# Patient Record
Sex: FEMALE | Race: WHITE | Employment: STUDENT | URBAN - METROPOLITAN AREA
[De-identification: names, ages, dates, MRNs, and addresses within clinical notes are randomized per-mention and may not be internally consistent; named-entity substitution may affect disease eponyms.]

---
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
## 2021-04-26 ENCOUNTER — MYC MEDICAL ADVICE (OUTPATIENT)
Dept: PEDIATRICS | Facility: CLINIC | Age: 21
End: 2021-04-26

## 2021-04-26 DIAGNOSIS — F81.9 LEARNING DIFFICULTY: Primary | ICD-10-CM

## 2021-09-26 ENCOUNTER — HEALTH MAINTENANCE LETTER (OUTPATIENT)
Age: 21
End: 2021-09-26

## 2022-01-16 ENCOUNTER — HEALTH MAINTENANCE LETTER (OUTPATIENT)
Age: 22
End: 2022-01-16

## 2022-01-19 NOTE — PROGRESS NOTES
Patient seen for full day appointment. Please refer to neuropsychological assessment report associated with the 1/25/22 at 8:00 appointment for full details.    Babak Shukla, PhD, ABPP

## 2022-01-19 NOTE — PROGRESS NOTES
RE: Zakiya Barnes  MR#: [id number]  : 2000  DOS: 2022  JONEL:  2022      NEUROPSYCHOLOGICAL CONSULTATION      REASON FOR REFERRAL:  Zakiya Barnes is a 21 year old female with 14 years of education who is currently a [employer] in college. The patient was referred for a neuropsychological evaluation by Dr. Vargas secondary to memory and learning concerns, particularly regarding reading. The evaluation was requested in order to document her cognitive and emotional functioning, assist with the differential diagnosis, and provide appropriate recommendations. The patient was informed that the evaluation included multiple measures of performance and symptom validity, and she was encouraged to provide her best effort at all times.  The nature of the neuropsychological evaluation was also discussed, including limits of confidentiality (for suspected child or elder abuse, potential homicide or suicide, and court orders). The patient was also informed that the report would be placed on the electronic medical records system.  The patient was also given an opportunity to ask questions. The patient indicated that she understood the information and consented to participate in the evaluation.    PROCEDURES:   Review of records and clinical interview  Mental Status-orientation, Wide Range Achievement Test-4, Wechsler Adult Intelligence Scale-IV, Stone Verbal Learning Test-Revised, Clock Drawing Test, Verbal Fluency Test, Personality Assessment Inventory, Shawn Complex Figure Test, Trailmaking Test, NAB Naming Test, TOMM, Judgment of Line Orientation Test, Stroop Test, Wisconsin Card Sorting Test and Wechsler Memory Scale-IV (selected subtests)    REVIEW OF RECORDS: Medical records from 21 noted concerns with learning difficulties. Records from 2020 noted that the patient was  concerned she has dyslexia.  It takes her along time to write a paragraph, reading and comprehension.  She  "feels like it is forever.  her roommate is writing a paper on dyslexia and she thinks that Zakiya has all the symptoms of this.  She is a sophomore at [employer]. All of her math grades were good in high school and writing was C's.  She is a music and psychology major.  Her end goal is to do music therapy.  Her teachers have told her in the past that she should be tested for dyslexia.  No neuroimaging scan of the head reports were found in the electronic medical records. Records noted that the patient was being treated with Isibloom, Kenalog, azelastine, acetaminophen, Benadryl, and loratadine.  However, during the interview the patient reported she was not currently taking any medications.    CLINICAL INTERVIEW: The patient was [facility] via video platform [facility] and she was interviewed alone. On interview, the patient reported that she has difficulty with reading and writing. Specifically, she noted in the spring semester she was attempting to complete a writing assignment and it took her 3-4 hours because she had difficulty making sense of the words, and her roommate expressed concern with possible dyslexia. She noted difficulty with reading comprehension, and indicated that when she has read a text \"it does not stick\".\" She stated that when she is doing multiple choice tests, she will miss details in the questions. She also reported that when she has in a high school Macedonian class and doing a dictation assignment, she would put the letters in the wrong order. She indicated that she has always had some concerns with her academic abilities, but this became more of an issue last year. In terms of her memory, she characterized herself as a better visual learner than verbal learning. She noted that she will occasionally misplace items, such as her phone. In terms of attention/concentration, she reported that she will often procrastinate and do other household chores before doing " "homework. She stated that she will fixate on chores if her home is cluttered or close any folding. She reported that her friends have said she is \"slightly OCD\". She noted that she has a good organizational system, but sometimes \"I'll freak out\" if \"something is off.\" She also noted difficulty with time management. Specifically, she reported she will often leave very early for appointments. She denied any functional difficulties with driving, financial management, or household chores. She indicated she is currently not taking any medications.    In terms of her mood, she reported she is generally upbeat, but gets frustrated with her reading difficulties. She reported that her friends have noticed she has high levels of anxiety, and when she is feeling very anxious she will go for a run. She also noted difficulty with falling asleep, and indicated she often will fall asleep between 1:00-3:00 AM. She noted that she sleeps about 6 hours per night on average. In terms of her appetite, she reported that she eats less than other people do. She reported that she typically eats 1-2 small meals per day and then snacks. She denied any contact with mental health professionals. She also denied any suicidal or homicidal ideation, and denied any history of suicide attempts. Further she denied any hallucinations or delusions. In terms of alcohol use, she reported she would drink between 0-2 alcoholic beverages per week. She denied any use of tobacco or illicit substances.    Medically, the patient reported that she is healthy. She denied any history of traumatic brain injury with loss of consciousness. She also denied any problems with pain. Further, she denied any history of multiple sclerosis, stroke, seizures, Parkinson's disease, Covid-19 infection, hypercholesterolemia, sleep apnea, high medicine, diabetes, heart disease, cancer, hypertension, liver disease, or kidney disease. She indicated she was eyeglasses all the time but " "denied any hearing problems.    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 She reported that her goal is to become a music therapist. She noted that her grade point average was between 3.0 and 4.0. She indicated that she is generally an \"A\" to \"B\" student, with occasional C's. She denied ever being in special education classes, except in elementary school she did receive extra assistance for reading. She denied ever having to repeat a grade. She noted that classes involving writing and some music history classes were particular challenging, but math is \"super easy.\" The patient said that she lives with 3 roommates at school. She reported she is from the [facility], and has a twin sister and older brother. She reported that she also works as a  in addition to going to school.    BEHAVIORAL OBSERVATIONS:  On examination, the patient was casually but appropriately dressed and groomed. The patient was cooperative with the evaluation and was talkative.  Her speech was normal in volume, rate and tone.  The patient also appeared to put forth her best effort on all tasks. Thus, the results of this evaluation are a reasonably valid reflection of the patient s current level of functioning. There were no indications of hallucinations, delusions or unusual thought processes and she was generally well oriented to personal information, place, and time. There were no demonstrations of a practical memory problem. The patient was a good historian, with a self-report consistent with medical records. Her affect was also generally appropriate.    RESULTS: Formal performance validity measures were mildly variable, indicating evidence that noncognitive factors may have impacted the results at times. Thus, results were interpreted with caution. In spite of this, many assessment tasks were quite strong. Specifically, measures of the " patient's verbally mediated intellectual functioning were in the average range, while visually mediated intellectual functioning was in the superior range and a significant personal strength.    On formal assessment of academic functioning, academic achievement in mathematics, including math problem solving and mathematical calculations, was quite strong, ranging from the upper half of the average range to the superior range. However, academic achievement in reading, including single word reading ability and pseudoword decoding (which assesses her ability to understand words are constructed), was in the low average range and at approximately the 7th-8th grade level. Reading comprehension was slightly higher, and at the lower end of the average range and at the 9th grade level. Academic achievement in writing, including written spelling and sentence composition, was likewise in the low average range and generally at the 7th grade level.    Measures of attention/concentration were mildly variable. Specifically, a digit span task was in the low average range, however mental calculation was in the high average range. A visual scanning task that integrates attention was in the superior range. Speed of information processing was well within expected limits. For example, psychomotor speed was in the superior range. Motor-free processing speed was likewise in the high average to superior range.    Measures of the patient's memory functioning were mildly variable. Specifically, immediate and delayed verbal recall on a story memory task was in the low average range. However, immediate and delayed verbal recall on a word list learning task was in the average range. Verbal recognition memory was also variable, ranging from below expected limits to well within expected limits. Visual memory was within expected limits. For example, immediate and delayed visual recall of a complex figure drawing was in the average range. Visual  recognition memory was also within expected limits.    Expressive language functioning was generally within expected limits.. For example, confrontation naming was in the average range. Semantic fluency was also in the average range, but phonemic fluency was in the low average range. Receptive language, as assessed by a complex ideational material test, was in the exceptionally low range. However, this is an isolated finding and likely related to other, noncognitive, factors rather than receptive language difficulties.    Visual-spatial and visual constructional abilities were within expected limits, and generally a personal strength. For example, a block construction task was in the high average range. Further, motor-free visual reasoning was in the high average range. Clock drawing and complex figure drawing task did not indicate evidence for significant visual-spatial distortions. Further, motor-free line orientation judgment was within expected limits.    Measures of the  patient's executive functioning were also within expected limits. For example, a measure of cognitive flexibility and set shifting ability was in the average range, indicating evidence that the patient could appropriately utilize feedback in order to alter and improve her performance. Additionally, a measure of response inhibition that integrates processing speed was in the high average range. A complex visual scanning task that integrates cognitive flexibility was in the high average range as well. Verbal reasoning was generally in the average range, while visual reasoning was in the high average range. Clock drawing and complex figure drawing tasks did not indicate evidence for significant planning or organizational difficulties.    Formal personality evaluation was completed utilizing the clinical interview and an objective measure of emotional functioning. On the objective measure, one validity scale that assesses an individual's ability to  attend and understand item content was above expected cutoffs, therefore this measure could not be interpreted. However, the pattern of results was consistent with her report of obsessive-compulsive tendencies.    SUMMARY: In summary, the neuropsychological assessment results indicated evidence for significant strengths in visual processing ability and visual-spatial abilities relative to lower, but still average, verbal abilities. However, academic achievement in reading was significantly poorer than expected and in the low average range. Likewise, academic achievement in written expression was poorer than expected and in the low average range. 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 In contrast, academic achievement in mathematics was quite strong and a personal strength. In terms of other cognitive abilities, attention/concentration was variable, which likely resulted in mild variability in learning new information. However, there was no evidence for rapid forgetting of previously learned information. Expressive language functioning, visual-spatial abilities, and executive functioning were all well within expected limits. Formal personality evaluation could not be formally interpreted, but the patient noted anxiety related difficulties, including some obsessive-compulsive traits, which are likely an attempt to manage her anxiety. The variability in task engagement and attention is likely related to her heightened level of anxiety.    RECOMMENDATIONS:   1. Given her heightened level of anxiety, a referral for psychotherapeutic intervention likely would be helpful. A highly structured cognitive-behavioral intervention focused on coping and stress management would likely be the most beneficial. One option for this service is  through the [facility] at [email or web address] or [phone]. Alternatively, the patient is encouraged to seek out psychotherapeutic resources through her campus counseling center.  2. Given the significant difficulties with reading and written expression, several academic accommodations are recommended, particularly when examinations or assignments involve reading/writing with timed deadlines.     Allow additional time for timed tests, particularly in classes with heavy reading and writing loads.. Specifically, she would benefit from 1.5 times the usual allotted time for these tests (e.g. 50% additional time). She is encouraged to use the entire allotted time given to re-read test questions carefully and review his answers.     Allow her to take examinations in a quiet room to minimize distractions.    Take a reduced course load, given the extended time required to successfully complete each class.  3. It is recommended that the patient optimize her environment to aid her attention and focus as well as compensate for variable processing speed. This includes:    Sit in the front of the room during lectures to minimize distractions.    Label, highlight, underline and add color to directions on assignments.     Have class notes printed and reviewed in advance.    If notes are not available, having the professor provide them in advance.     Record class lectures so they can be reviewed at a later time and at a pace that is comfortable for the patient.     The patient is encouraged to seek out a  for additional assistance with reading and written expression.   4. In order to promote learning and memorization of new verbal material, it is recommended that the patient add structure to the material she is learning to promote subsequent recall (e.g., use mnemonic devices, acronyms, make up a story or song). Additionally, she is encouraged to use  visual aids, such as flash cards, diagrams, charts, etc.   5. The patient may find the following attention and organizational strategies helpful:     Use cell phone reminders to help monitor upcoming appointments and due dates as well as other important tasks (e.g., when to take medications). Set the reminder to go off several times prior to the event with advanced notice (e.g., one week before, two days before, the day before, and the morning of the event).     She should attempt to reduce distractions at home. Having a clutter-free work environment and removing or at least making it harder to access potential distractions would help optimize her attention. She might also consider facing away from high traffic areas and using earplugs or noise cancellation headphones when desiring a quiet work environment.    Taking short breaks during her day may help optimize and maintain her concentration.     It is common for individuals like the patient who struggle with variable attention to develop procrastination habits. She may find it useful to evaluate whether she is procrastinating on a task because she considers it to be overwhelming. If so, she may find it useful to break the task down into more manageable components or steps.    Make to-do lists and prioritize tasks. Break down large tasks into smaller steps and check off each small step when completed.   6. The results of the evaluation now constitute a baseline of the patient s cognitive and emotional functioning. If further cognitive difficulties are observed in the future, a referral for a neuropsychological re-evaluation at that time might be considered.    Results and recommendations were provided the patient via telephone on 1/25/2022 and her questions were answered.  Thank you for referring this interesting individual. If you have any questions, please feel free to contact me.      Babak Shukla, PhD, ABPP, LP  Professor and Licensed Psychologist  [id number]  Board Certified Clinical Neuropsychologist    Time Spent:      Minutes Date Code Units   Total Time-Neuropsychologist Professional 278 1/25/2022 34507 1     1/25/2022 57435 4   Neuropsychologist Admin/scoring 0 1/25/2022 83201 0     1/25/2022 93667 0   Diagnostic Interview  17 1/25/2022 82330 1   Psychometrician Time-Test Administration/Score 368 1/25/2022 93240 1     1/25/2022 57902 11   Diagnosis R41.3 F41.9 F81.0 F81.81

## 2022-01-25 ENCOUNTER — OFFICE VISIT (OUTPATIENT)
Dept: NEUROPSYCHOLOGY | Facility: CLINIC | Age: 22
End: 2022-01-25
Attending: NURSE PRACTITIONER
Payer: COMMERCIAL

## 2022-01-25 DIAGNOSIS — F41.9 ANXIETY: ICD-10-CM

## 2022-01-25 DIAGNOSIS — R41.3 MEMORY LOSS: Primary | ICD-10-CM

## 2022-01-25 DIAGNOSIS — F81.81 DISORDER OF WRITTEN EXPRESSION: ICD-10-CM

## 2022-01-25 DIAGNOSIS — Z03.89 NO DIAGNOSIS ON AXIS I: Primary | ICD-10-CM

## 2022-01-25 DIAGNOSIS — F81.0 DEVELOPMENTAL READING DISORDER: ICD-10-CM

## 2022-01-25 PROCEDURE — 96138 PSYCL/NRPSYC TECH 1ST: CPT | Performed by: PSYCHOLOGIST

## 2022-01-25 PROCEDURE — 99207 PR NO CHARGE LOS: CPT | Performed by: PSYCHOLOGIST

## 2022-01-25 PROCEDURE — 96139 PSYCL/NRPSYC TST TECH EA: CPT | Performed by: PSYCHOLOGIST

## 2022-01-25 PROCEDURE — 96133 NRPSYC TST EVAL PHYS/QHP EA: CPT | Performed by: PSYCHOLOGIST

## 2022-01-25 PROCEDURE — 90791 PSYCH DIAGNOSTIC EVALUATION: CPT | Performed by: PSYCHOLOGIST

## 2022-01-25 PROCEDURE — 96132 NRPSYC TST EVAL PHYS/QHP 1ST: CPT | Performed by: PSYCHOLOGIST

## 2022-01-25 NOTE — PROGRESS NOTES
Pt was seen for neuropsychological evaluation at the request of Marce COYLE CNP for the purposes of diagnostic clarification and treatment planning. 368 minutes of test administration and scoring were provided by this writer. Please see Dr. Babak Shukla's report for a full interpretation of the findings.    Judy Strickland  Psychometrist

## 2022-01-25 NOTE — LETTER
2022      RE: Zakiya Barnes  [address] [facility] [id number]     RE: Zakiya Barnes  MR#: [id number]  : 2000  DOS: 2022  JONEL:  2022      NEUROPSYCHOLOGICAL CONSULTATION      REASON FOR REFERRAL:  Zakiya Barnes is a 21 year old female with 14 years of education who is currently a [employer] in college. The patient was referred for a neuropsychological evaluation by Dr. Vargas secondary to memory and learning concerns, particularly regarding reading. The evaluation was requested in order to document her cognitive and emotional functioning, assist with the differential diagnosis, and provide appropriate recommendations. The patient was informed that the evaluation included multiple measures of performance and symptom validity, and she was encouraged to provide her best effort at all times.  The nature of the neuropsychological evaluation was also discussed, including limits of confidentiality (for suspected child or elder abuse, potential homicide or suicide, and court orders). The patient was also informed that the report would be placed on the electronic medical records system.  The patient was also given an opportunity to ask questions. The patient indicated that she understood the information and consented to participate in the evaluation.    PROCEDURES:   Review of records and clinical interview  Mental Status-orientation, Wide Range Achievement Test-4, Wechsler Adult Intelligence Scale-IV, Stone Verbal Learning Test-Revised, Clock Drawing Test, Verbal Fluency Test, Personality Assessment Inventory, Shawn Complex Figure Test, Trailmaking Test, NAB Naming Test, TOMM, Judgment of Line Orientation Test, Stroop Test, [place] Card Sorting Test and Wechsler Memory Scale-IV (selected subtests)    REVIEW OF RECORDS: Medical records from 21 noted concerns with learning difficulties. Records from 2020 noted that the patient was  concerned she has  "dyslexia.  It takes her along time to write a paragraph, reading and comprehension.  She feels like it is forever.  her roommate is writing a paper on dyslexia and she thinks that Zakiya has all the symptoms of this.  She is a sophomore at [employer]. All of her math grades were good in high school and writing was C's.  She is a music and psychology major.  Her end goal is to do music therapy.  Her teachers have told her in the past that she should be tested for dyslexia.  No neuroimaging scan of the head reports were found in the electronic medical records. Records noted that the patient was being treated with Isibloom, Kenalog, azelastine, acetaminophen, Benadryl, and loratadine.  However, during the interview the patient reported she was not currently taking any medications.    CLINICAL INTERVIEW: The patient was [facility] platform [facility] and she was interviewed alone. On interview, the patient reported that she has difficulty with reading and writing. Specifically, she noted in the spring semester she was attempting to complete a writing assignment and it took her 3-4 hours because she had difficulty making sense of the words, and her roommate expressed concern with possible dyslexia. She noted difficulty with reading comprehension, and indicated that when she has read a text \"it does not stick\".\" She stated that when she is doing multiple choice tests, she will miss details in the questions. She also reported that when she has in a high school Setswana class and doing a dictation assignment, she would put the letters in the wrong order. She indicated that she has always had some concerns with her academic abilities, but this became more of an issue last year. In terms of her memory, she characterized herself as a better visual learner than verbal learning. She noted that she will occasionally misplace items, such as her phone. In terms of attention/concentration, she " "reported that she will often procrastinate and do other household chores before doing homework. She stated that she will fixate on chores if her home is cluttered or close any folding. She reported that her friends have said she is \"slightly OCD\". She noted that she has a good organizational system, but sometimes \"I'll freak out\" if \"something is off.\" She also noted difficulty with time management. Specifically, she reported she will often leave very early for appointments. She denied any functional difficulties with driving, financial management, or household chores. She indicated she is currently not taking any medications.    In terms of her mood, she reported she is generally upbeat, but gets frustrated with her reading difficulties. She reported that her friends have noticed she has high levels of anxiety, and when she is feeling very anxious she will go for a run. She also noted difficulty with falling asleep, and indicated she often will fall asleep between 1:00-3:00 AM. She noted that she sleeps about 6 hours per night on average. In terms of her appetite, she reported that she eats less than other people do. She reported that she typically eats 1-2 small meals per day and then snacks. She denied any contact with mental health professionals. She also denied any suicidal or homicidal ideation, and denied any history of suicide attempts. Further she denied any hallucinations or delusions. In terms of alcohol use, she reported she would drink between 0-2 alcoholic beverages per week. She denied any use of tobacco or illicit substances.    Medically, the patient reported that she is healthy. She denied any history of traumatic brain injury with loss of consciousness. She also denied any problems with pain. Further, she denied any history of multiple sclerosis, stroke, seizures, Parkinson's disease, Covid-19 infection, hypercholesterolemia, sleep apnea, high medicine, diabetes, heart disease, cancer, " "hypertension, liver disease, or kidney disease. She indicated she was eyeglasses all the time but denied any hearing problems.    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 She reported that her goal is to become a music therapist. She noted that her grade point average was between 3.0 and 4.0. She indicated that she is generally an \"A\" to \"B\" student, with occasional C's. She denied ever being in special education classes, except in elementary school she did receive extra assistance for reading. She denied ever having to repeat a grade. She noted that classes involving writing and some music history classes were particular challenging, but math is \"super easy.\" The patient said that she lives with 3 roommates at school. She reported she is from the [facility], and has a twin sister and older brother. She reported that she also works as a  in addition to going to school.    BEHAVIORAL OBSERVATIONS:  On examination, the patient was casually but appropriately dressed and groomed. The patient was cooperative with the evaluation and was talkative.  Her speech was normal in volume, rate and tone.  The patient also appeared to put forth her best effort on all tasks. Thus, the results of this evaluation are a reasonably valid reflection of the patient s current level of functioning. There were no indications of hallucinations, delusions or unusual thought processes and she was generally well oriented to personal information, place, and time. There were no demonstrations of a practical memory problem. The patient was a good historian, with a self-report consistent with medical records. Her affect was also generally appropriate.    RESULTS: Formal performance validity measures were mildly variable, indicating evidence that noncognitive factors may have impacted the results at times. Thus, results were interpreted with " caution. In spite of this, many assessment tasks were quite strong. Specifically, measures of the patient's verbally mediated intellectual functioning were in the average range, while visually mediated intellectual functioning was in the superior range and a significant personal strength.    On formal assessment of academic functioning, academic achievement in mathematics, including math problem solving and mathematical calculations, was quite strong, ranging from the upper half of the average range to the superior range. However, academic achievement in reading, including single word reading ability and pseudoword decoding (which assesses her ability to understand words are constructed), was in the low average range and at approximately the 7th-8th grade level. Reading comprehension was slightly higher, and at the lower end of the average range and at the 9th grade level. Academic achievement in writing, including written spelling and sentence composition, was likewise in the low average range and generally at the 7th grade level.    Measures of attention/concentration were mildly variable. Specifically, a digit span task was in the low average range, however mental calculation was in the high average range. A visual scanning task that integrates attention was in the superior range. Speed of information processing was well within expected limits. For example, psychomotor speed was in the superior range. Motor-free processing speed was likewise in the high average to superior range.    Measures of the patient's memory functioning were mildly variable. Specifically, immediate and delayed verbal recall on a story memory task was in the low average range. However, immediate and delayed verbal recall on a word list learning task was in the average range. Verbal recognition memory was also variable, ranging from below expected limits to well within expected limits. Visual memory was within expected limits. For example,  immediate and delayed visual recall of a complex figure drawing was in the average range. Visual recognition memory was also within expected limits.    Expressive language functioning was generally within expected limits.. For example, confrontation naming was in the average range. Semantic fluency was also in the average range, but phonemic fluency was in the low average range. Receptive language, as assessed by a complex ideational material test, was in the exceptionally low range. However, this is an isolated finding and likely related to other, noncognitive, factors rather than receptive language difficulties.    Visual-spatial and visual constructional abilities were within expected limits, and generally a personal strength. For example, a block construction task was in the high average range. Further, motor-free visual reasoning was in the high average range. Clock drawing and complex figure drawing task did not indicate evidence for significant visual-spatial distortions. Further, motor-free line orientation judgment was within expected limits.    Measures of the  patient's executive functioning were also within expected limits. For example, a measure of cognitive flexibility and set shifting ability was in the average range, indicating evidence that the patient could appropriately utilize feedback in order to alter and improve her performance. Additionally, a measure of response inhibition that integrates processing speed was in the high average range. A complex visual scanning task that integrates cognitive flexibility was in the high average range as well. Verbal reasoning was generally in the average range, while visual reasoning was in the high average range. Clock drawing and complex figure drawing tasks did not indicate evidence for significant planning or organizational difficulties.    Formal personality evaluation was completed utilizing the clinical interview and an objective measure of emotional  functioning. On the objective measure, one validity scale that assesses an individual's ability to attend and understand item content was above expected cutoffs, therefore this measure could not be interpreted. However, the pattern of results was consistent with her report of obsessive-compulsive tendencies.    SUMMARY: In summary, the neuropsychological assessment results indicated evidence for significant strengths in visual processing ability and visual-spatial abilities relative to lower, but still average, verbal abilities. However, academic achievement in reading was significantly poorer than expected and in the low average range. Likewise, academic achievement in written expression was poorer than expected and in the low average range. 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 In contrast, academic achievement in mathematics was quite strong and a personal strength. In terms of other cognitive abilities, attention/concentration was variable, which likely resulted in mild variability in learning new information. However, there was no evidence for rapid forgetting of previously learned information. Expressive language functioning, visual-spatial abilities, and executive functioning were all well within expected limits. Formal personality evaluation could not be formally interpreted, but the patient noted anxiety related difficulties, including some obsessive-compulsive traits, which are likely an attempt to manage her anxiety. The variability in task engagement and attention is likely related to her heightened level of anxiety.    RECOMMENDATIONS:   1. Given her heightened level of anxiety, a referral for psychotherapeutic intervention likely would be helpful. A highly structured cognitive-behavioral intervention focused on  coping and stress management would likely be the most beneficial. One option for this service is through the [facility] at [email or web address] or [phone]. Alternatively, the patient is encouraged to seek out psychotherapeutic resources through her campus counseling center.  2. Given the significant difficulties with reading and written expression, several academic accommodations are recommended, particularly when examinations or assignments involve reading/writing with timed deadlines.     Allow additional time for timed tests, particularly in classes with heavy reading and writing loads.. Specifically, she would benefit from 1.5 times the usual allotted time for these tests (e.g. 50% additional time). She is encouraged to use the entire allotted time given to re-read test questions carefully and review his answers.     Allow her to take examinations in a quiet room to minimize distractions.    Take a reduced course load, given the extended time required to successfully complete each class.  3. It is recommended that the patient optimize her environment to aid her attention and focus as well as compensate for variable processing speed. This includes:    Sit in the front of the room during lectures to minimize distractions.    Label, highlight, underline and add color to directions on assignments.     Have class notes printed and reviewed in advance.    If notes are not available, having the professor provide them in advance.     Record class lectures so they can be reviewed at a later time and at a pace that is comfortable for the patient.     The patient is encouraged to seek out a  for additional assistance with reading and written expression.   4. In order to promote learning and memorization of new verbal material, it is recommended that the patient add structure to the material she is learning to promote subsequent recall (e.g.,  use mnemonic devices, acronyms, make up a story or song). Additionally, she is encouraged to use visual aids, such as flash cards, diagrams, charts, etc.   5. The patient may find the following attention and organizational strategies helpful:     Use cell phone reminders to help monitor upcoming appointments and due dates as well as other important tasks (e.g., when to take medications). Set the reminder to go off several times prior to the event with advanced notice (e.g., one week before, two days before, the day before, and the morning of the event).     She should attempt to reduce distractions at home. Having a clutter-free work environment and removing or at least making it harder to access potential distractions would help optimize her attention. She might also consider facing away from high traffic areas and using earplugs or noise cancellation headphones when desiring a quiet work environment.    Taking short breaks during her day may help optimize and maintain her concentration.     It is common for individuals like the patient who struggle with variable attention to develop procrastination habits. She may find it useful to evaluate whether she is procrastinating on a task because she considers it to be overwhelming. If so, she may find it useful to break the task down into more manageable components or steps.    Make to-do lists and prioritize tasks. Break down large tasks into smaller steps and check off each small step when completed.   6. The results of the evaluation now constitute a baseline of the patient s cognitive and emotional functioning. If further cognitive difficulties are observed in the future, a referral for a neuropsychological re-evaluation at that time might be considered.    Results and recommendations were provided the patient via telephone on 1/25/2022 and her questions were answered.  Thank you for referring this interesting individual. If you have any questions, please feel free  to contact me.      Babak Shukla, PhD, ABPP, LP  Professor and Licensed Psychologist [id number]  Board Certified Clinical Neuropsychologist    Time Spent:      Minutes Date Code Units   Total Time-Neuropsychologist Professional 278 1/25/2022 92882 1     1/25/2022 88725 4   Neuropsychologist Admin/scoring 0 1/25/2022 85423 0     1/25/2022 57655 0   Diagnostic Interview  17 1/25/2022 23545 1   Psychometrician Time-Test Administration/Score 368 1/25/2022 78471 1     1/25/2022 56362 11   Diagnosis R41.3 F41.9 F81.0 F81.81

## 2022-01-26 NOTE — PROGRESS NOTES
NAME  Zakiya Barnes  2022     MRN  [id number]   PROVIDER        2000    formerly [facility]     AGE  1899    STATION  OP     SEX  Female           HANDEDNESS Right           EDUCATION 14                        ORIENTATION            Time 0            Place 2            Personal info 4            Presidents 3                         TOMM             Trial 1 50            Trial 2 50                         WAIS-IV             Digit Span RDS 6                        WAIS-IV               Raw SS          Similarities  27 11          Vocabulary 31 9          Information 15 11          Block Design  62 15          Matrix Reasoning 24 14          Visual Puzzles 20 13          Digit Span 23 7          Arithmetic 18 13          Symbol Search 54 19          Coding  107 17                       VCI:  102 SHAHZAD: 123          WMI: 100 PSI: 143          FSIQ: 120                         WIAT-III               Raw SS GE         Reading Comprehension 36 92 9.6         Math Problem Solving 60 109 >12.9         Sentence Composition 177 86 7.4         Word Reading 95 84 8.1         Pseudoword Decoding 29 81 4.7         Numerical Operations 51 121 >12.9         Spelling  35 80 7.7                      TRAIL MAKING TEST             Raw SS T Errors         Trial A 13 17 73 0         Trial B 35 15 62 0                       HVLT   1           Raw T          Trial 1  8           Trial 2  10           Trial 3  10           Learning  2           Total Recall 28 46          Delayed Recall 10 49          Percent Retention 100% 58          True Positives 11           False Positives 2           Disc. Index 9 <20                       JEWEL-O COMPLEX FIGURE             Raw T %ile         Time to Copy 128  >16         Copy  34  >16         Immediate Recall 26 53 62         30 Minute Recall 22 43 24         Recognition Total 23 59 82                      WMS-IV               Raw SSa/%ile          LM I  18 7          LM II  14 7           LM Recognition 23 26-50                        NAB NAMING  1         Raw 30 /31           z 0.28            T 58                         COWAT FAS            Raw 36            SS 9            T 39                         ANIMAL FLUENCY            Raw 23            SS 12            T 48                         COMPLEX IDEATION            Raw 8            SS 3            T 13                         LINDA    H         Raw 29            %ile >86                         CLOCK DRAWING            Command: Borderline          Copy:  Normal                        STROOP              Raw T           Word 126 64           Color 98 65           C/W 71 73           Intf. 16 66                        [place] CARD SORTING TEST            Raw T %ile         # Categories 6  >16         Total Correct 70           Total Errors 11 54 66         Perseverative Err. 5 57 75         % Concept Resp. 70 56 73         FTMS:  1           LTL  1.82  >16                      HANNA

## 2023-04-23 ENCOUNTER — HEALTH MAINTENANCE LETTER (OUTPATIENT)
Age: 23
End: 2023-04-23

## 2024-06-30 ENCOUNTER — HEALTH MAINTENANCE LETTER (OUTPATIENT)
Age: 24
End: 2024-06-30
